# Patient Record
Sex: MALE
[De-identification: names, ages, dates, MRNs, and addresses within clinical notes are randomized per-mention and may not be internally consistent; named-entity substitution may affect disease eponyms.]

---
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
## 2023-09-21 ENCOUNTER — OFFICE (OUTPATIENT)
Dept: URBAN - METROPOLITAN AREA CLINIC 112 | Facility: CLINIC | Age: 18
Setting detail: OPHTHALMOLOGY
End: 2023-09-21
Payer: COMMERCIAL

## 2023-09-21 DIAGNOSIS — H01.005: ICD-10-CM

## 2023-09-21 DIAGNOSIS — H52.13: ICD-10-CM

## 2023-09-21 DIAGNOSIS — H01.002: ICD-10-CM

## 2023-09-21 PROCEDURE — 92015 DETERMINE REFRACTIVE STATE: CPT | Performed by: OPHTHALMOLOGY

## 2023-09-21 PROCEDURE — 92014 COMPRE OPH EXAM EST PT 1/>: CPT | Performed by: OPHTHALMOLOGY

## 2023-09-21 ASSESSMENT — KERATOMETRY
OS_K1POWER_DIOPTERS: 42.00
OS_K2POWER_DIOPTERS: 42.75
OD_K2POWER_DIOPTERS: 42.75
OS_AXISANGLE_DEGREES: 089
OD_K1POWER_DIOPTERS: 42.00
OD_AXISANGLE_DEGREES: 085

## 2023-09-21 ASSESSMENT — SPHEQUIV_DERIVED
OS_SPHEQUIV: -1.5
OD_SPHEQUIV: -1.5
OS_SPHEQUIV: -1.5
OD_SPHEQUIV: -1.5

## 2023-09-21 ASSESSMENT — REFRACTION_MANIFEST
OS_SPHERE: -1.25
OD_SPHERE: -1.25
OS_AXIS: 170
OS_VA1: 20/20
OD_VA1: 20/20
OD_AXIS: 15
OD_CYLINDER: -0.50
OS_CYLINDER: -0.50

## 2023-09-21 ASSESSMENT — REFRACTION_CURRENTRX
OD_AXIS: 023
OS_OVR_VA: 20/
OD_OVR_VA: 20/
OS_VPRISM_DIRECTION: SV
OD_VPRISM_DIRECTION: SV
OS_CYLINDER: -0.50
OD_CYLINDER: -0.25
OS_AXIS: 163
OD_SPHERE: -1.25
OS_SPHERE: -1.00

## 2023-09-21 ASSESSMENT — AXIALLENGTH_DERIVED
OD_AL: 24.6336
OS_AL: 24.6336
OS_AL: 24.6336
OD_AL: 24.6336

## 2023-09-21 ASSESSMENT — REFRACTION_AUTOREFRACTION
OD_AXIS: 014
OS_AXIS: 170
OD_SPHERE: -1.25
OD_CYLINDER: -0.50
OS_SPHERE: -1.25
OS_CYLINDER: -0.50

## 2023-09-21 ASSESSMENT — LID EXAM ASSESSMENTS
OD_BLEPHARITIS: RLL T
OS_BLEPHARITIS: LLL T

## 2023-09-21 ASSESSMENT — CONFRONTATIONAL VISUAL FIELD TEST (CVF)
OD_FINDINGS: FULL
OS_FINDINGS: FULL

## 2023-09-21 ASSESSMENT — VISUAL ACUITY
OS_BCVA: 20/20
OD_BCVA: 20/20

## 2024-05-13 ENCOUNTER — NON-APPOINTMENT (OUTPATIENT)
Age: 19
End: 2024-05-13

## 2024-12-27 ENCOUNTER — OFFICE (OUTPATIENT)
Dept: URBAN - METROPOLITAN AREA CLINIC 112 | Facility: CLINIC | Age: 19
Setting detail: OPHTHALMOLOGY
End: 2024-12-27
Payer: COMMERCIAL

## 2024-12-27 DIAGNOSIS — H01.005: ICD-10-CM

## 2024-12-27 DIAGNOSIS — H10.45: ICD-10-CM

## 2024-12-27 DIAGNOSIS — H01.001: ICD-10-CM

## 2024-12-27 DIAGNOSIS — H01.002: ICD-10-CM

## 2024-12-27 DIAGNOSIS — H01.004: ICD-10-CM

## 2024-12-27 PROCEDURE — 99213 OFFICE O/P EST LOW 20 MIN: CPT | Performed by: REGISTERED NURSE

## 2024-12-27 ASSESSMENT — LID EXAM ASSESSMENTS
OS_BLEPHARITIS: LLL LUL T
OD_BLEPHARITIS: RLL RUL T

## 2024-12-27 ASSESSMENT — REFRACTION_AUTOREFRACTION
OD_AXIS: 010
OD_CYLINDER: -0.75
OD_SPHERE: -1.50
OS_AXIS: 171
OS_CYLINDER: -0.75
OS_SPHERE: -1.25

## 2024-12-27 ASSESSMENT — REFRACTION_CURRENTRX
OS_OVR_VA: 20/
OD_SPHERE: -1.25
OS_SPHERE: -1.00
OD_CYLINDER: -0.25
OS_AXIS: 163
OD_VPRISM_DIRECTION: SV
OS_CYLINDER: -0.50
OD_AXIS: 023
OD_OVR_VA: 20/
OS_VPRISM_DIRECTION: SV

## 2024-12-27 ASSESSMENT — REFRACTION_MANIFEST
OS_SPHERE: -1.25
OD_CYLINDER: -0.50
OD_AXIS: 010
OS_CYLINDER: -0.50
OS_AXIS: 170
OS_SPHERE: -1.25
OS_AXIS: 170
OS_VA1: 20/20
OS_CYLINDER: -0.50
OS_VA1: 20/20
OD_CYLINDER: -0.50
OD_AXIS: 15
OD_VA1: 20/20
OD_SPHERE: -1.25
OD_SPHERE: -1.25
OD_VA1: 20/20

## 2024-12-27 ASSESSMENT — TONOMETRY
OD_IOP_MMHG: 17
OS_IOP_MMHG: 16

## 2024-12-27 ASSESSMENT — KERATOMETRY
OS_K1POWER_DIOPTERS: 41.75
OD_K1POWER_DIOPTERS: 41.75
OS_K2POWER_DIOPTERS: 42.75
OD_AXISANGLE_DEGREES: 091
OD_K2POWER_DIOPTERS: 43.00
OS_AXISANGLE_DEGREES: 093

## 2024-12-27 ASSESSMENT — VISUAL ACUITY
OD_BCVA: 20/20
OS_BCVA: 20/20

## 2024-12-27 ASSESSMENT — CONFRONTATIONAL VISUAL FIELD TEST (CVF)
OD_FINDINGS: FULL
OS_FINDINGS: FULL